# Patient Record
Sex: FEMALE | Race: AMERICAN INDIAN OR ALASKA NATIVE
[De-identification: names, ages, dates, MRNs, and addresses within clinical notes are randomized per-mention and may not be internally consistent; named-entity substitution may affect disease eponyms.]

---

## 2018-04-23 ENCOUNTER — HOSPITAL ENCOUNTER (OUTPATIENT)
Dept: HOSPITAL 5 - SPVIMAG | Age: 58
Discharge: HOME | End: 2018-04-23
Attending: FAMILY MEDICINE
Payer: COMMERCIAL

## 2018-04-23 DIAGNOSIS — N28.1: Primary | ICD-10-CM

## 2018-04-23 PROCEDURE — 74183 MRI ABD W/O CNTR FLWD CNTR: CPT

## 2018-04-23 PROCEDURE — A9577 INJ MULTIHANCE: HCPCS

## 2018-04-23 NOTE — MAGNETIC RESONANCE REPORT
MRI ABDOMEN  WITHOUT AND WITH CONTRAST : 04/23/18



CLINICAL: Epigastric pain which has been persistent for 2 years.



COMPARISON :None.



TECHNIQUE: Axial T1 in phase and opposed phase, coronal and axial T2 

and axial T2 fat sat sequences plus multiphase postcontrast T1 fat sat 

sequences on a 1.5 Samantha magnet.  15.0 cc of Multihance was injected 

intravenously for the contrast portion of the exam and consent was 

obtained prior to the administration of the contrast.



FINDINGS: Normal liver size, contour and signal.  No liver mass.  

Normal gallbladder and bile ducts.  Normal stomach, distal esophagus, 

duodenum, pancreas and spleen.  Normal adrenal glands.  The renal 

collecting systems and ureters are nondilated.  The kidneys are normal 

size and demonstrate normal enhancement.  A 1.2 cm right upper pole 

renal cyst.  No solid mass.  Normal aorta and inferior vena cava.  The 

imaged portions of small bowel and colon are normal.  No ascites.  The 

anterior abdominal wall is intact with no ventral hernia.  The bones 

and soft tissues are normal.



IMPRESSION: Normal study except for a 1.2 cm right upper pole benign 

renal cyst.